# Patient Record
Sex: FEMALE | Race: OTHER | Employment: FULL TIME | URBAN - METROPOLITAN AREA
[De-identification: names, ages, dates, MRNs, and addresses within clinical notes are randomized per-mention and may not be internally consistent; named-entity substitution may affect disease eponyms.]

---

## 2020-04-01 ENCOUNTER — OFFICE VISIT (OUTPATIENT)
Dept: URGENT CARE | Facility: CLINIC | Age: 30
End: 2020-04-01
Payer: COMMERCIAL

## 2020-04-01 VITALS
HEART RATE: 86 BPM | DIASTOLIC BLOOD PRESSURE: 86 MMHG | SYSTOLIC BLOOD PRESSURE: 126 MMHG | HEIGHT: 62 IN | BODY MASS INDEX: 27.6 KG/M2 | OXYGEN SATURATION: 98 % | TEMPERATURE: 99 F | WEIGHT: 150 LBS | RESPIRATION RATE: 18 BRPM

## 2020-04-01 DIAGNOSIS — R51.9 NONINTRACTABLE HEADACHE, UNSPECIFIED CHRONICITY PATTERN, UNSPECIFIED HEADACHE TYPE: ICD-10-CM

## 2020-04-01 DIAGNOSIS — R11.0 NAUSEA: Primary | ICD-10-CM

## 2020-04-01 PROCEDURE — 99203 OFFICE O/P NEW LOW 30 MIN: CPT | Mod: S$GLB,,, | Performed by: NURSE PRACTITIONER

## 2020-04-01 PROCEDURE — 99203 PR OFFICE/OUTPT VISIT, NEW, LEVL III, 30-44 MIN: ICD-10-PCS | Mod: S$GLB,,, | Performed by: NURSE PRACTITIONER

## 2020-04-01 RX ORDER — ONDANSETRON 4 MG/1
4 TABLET, ORALLY DISINTEGRATING ORAL EVERY 8 HOURS PRN
Qty: 15 TABLET | Refills: 0 | Status: SHIPPED | OUTPATIENT
Start: 2020-04-01

## 2020-04-01 NOTE — PATIENT INSTRUCTIONS
Nausea & Vomiting  If your condition worsens or fails to improve we recommend that you receive another evaluation at the ER immediately or contact your PCP to discuss your concerns or return here. You must understand that you've received an urgent care treatment only and that you may be released before all your medical problems are known or treated. You the patient will arrange for followup care as instructed.   Use prescribed anti-nausea medicine as needed and prescribed   Increase fluids and rest is important   Start off with liquid diet and progress as tolerated (see below)  · Water and clear liquids are important so you do not get dehydrated. Drink a small amount at a time.  · Do not force yourself to eat, especially if you have cramps, vomiting, or diarrhea. When you finally decide to start eating, do not eat large amounts at a time, even if you are hungry.  · If you eat, avoid fatty, greasy, spicy, or fried foods.  · Do not eat dairy products if you have diarrhea; they can make the diarrhea worse  Watch for any increase pain, fever, localized pain to right lower abdomen or continued vomiting or diarrhea.    Use gatorade/pedialyte/coconut water or rehydration packets to help stay hydrated. Vitamin water and plain water do not contain rehydrating electrolytes.  Increase clear liquids (water, gatorade, pedialyte, broths, jello, etc) Hold off on solids for 12-18 hours. Then advance to BRAT diet (banana, rice, applesauce, tea, toast/crackers), then advance further as tolerated.  Use Peptobismol to help alleviate your diarrhea symptoms. Avoid immodium.

## 2020-04-01 NOTE — PROGRESS NOTES
"Subjective:       Patient ID: Brandi Connors is a 30 y.o. female.    Vitals:  height is 5' 2.4" (1.585 m) and weight is 68 kg (150 lb). Her temperature is 98.7 °F (37.1 °C). Her blood pressure is 126/86 and her pulse is 86. Her respiration is 18 and oxygen saturation is 98%.     Chief Complaint: Nausea    Nausea   This is a new problem. The current episode started in the past 7 days (3 to 4 days ago). The problem occurs rarely. The problem has been unchanged. Associated symptoms include abdominal pain and nausea. Pertinent negatives include no anorexia, arthralgias, change in bowel habit, chest pain, chills, congestion, coughing, diaphoresis, fatigue, fever, headaches, joint swelling, myalgias, neck pain, numbness, rash, sore throat, swollen glands, urinary symptoms, vertigo, visual change, vomiting or weakness. Exacerbated by: sitting. Treatments tried: nextium. The treatment provided no relief.       Constitution: Negative for appetite change, chills, sweating, fatigue and fever.   HENT: Negative for congestion, sore throat and trouble swallowing.    Neck: Negative for neck pain.   Cardiovascular: Negative for chest pain.   Respiratory: Negative for cough and shortness of breath.    Gastrointestinal: Positive for abdominal pain and nausea. Negative for abdominal trauma, abdominal bloating, history of abdominal surgery, vomiting, constipation, diarrhea, dark colored stools and heartburn.   Genitourinary: Negative for dysuria, missed menses and pelvic pain.   Musculoskeletal: Negative for joint pain, joint swelling, back pain and muscle ache.   Skin: Negative for rash.   Neurological: Negative for history of vertigo, headaches and numbness.       Objective:      Physical Exam   Constitutional: She is oriented to person, place, and time. She appears well-developed and well-nourished.   HENT:   Head: Normocephalic and atraumatic.   Right Ear: External ear normal.   Left Ear: External ear normal.   Nose: Nose normal. "   Mouth/Throat: Mucous membranes are normal.   Eyes: Conjunctivae and lids are normal.   Neck: Trachea normal and full passive range of motion without pain. Neck supple.   Cardiovascular: Normal rate, regular rhythm and normal heart sounds.   Pulmonary/Chest: Effort normal and breath sounds normal. No respiratory distress.   Abdominal: Soft. Normal appearance and bowel sounds are normal. She exhibits no distension, no abdominal bruit, no pulsatile midline mass and no mass. There is no tenderness.   Nausea after eating pizza four days ago.  Non - tender abdomen   Musculoskeletal: Normal range of motion. She exhibits no edema.   Neurological: She is alert and oriented to person, place, and time. She has normal strength.   Mild headache   Skin: Skin is warm, dry, intact, not diaphoretic and not pale.   Psychiatric: She has a normal mood and affect. Her speech is normal and behavior is normal. Judgment and thought content normal. Cognition and memory are normal.   Nursing note and vitals reviewed.        Assessment:       1. Nausea    2. Nonintractable headache, unspecified chronicity pattern, unspecified headache type        Plan:         Nausea  -     ondansetron (ZOFRAN-ODT) 4 MG TbDL; Take 1 tablet (4 mg total) by mouth every 8 (eight) hours as needed.  Dispense: 15 tablet; Refill: 0    Nonintractable headache, unspecified chronicity pattern, unspecified headache type          Patient Instructions   Nausea & Vomiting  If your condition worsens or fails to improve we recommend that you receive another evaluation at the ER immediately or contact your PCP to discuss your concerns or return here. You must understand that you've received an urgent care treatment only and that you may be released before all your medical problems are known or treated. You the patient will arrange for followup care as instructed.   Use prescribed anti-nausea medicine as needed and prescribed   Increase fluids and rest is important   Start  off with liquid diet and progress as tolerated (see below)  · Water and clear liquids are important so you do not get dehydrated. Drink a small amount at a time.  · Do not force yourself to eat, especially if you have cramps, vomiting, or diarrhea. When you finally decide to start eating, do not eat large amounts at a time, even if you are hungry.  · If you eat, avoid fatty, greasy, spicy, or fried foods.  · Do not eat dairy products if you have diarrhea; they can make the diarrhea worse  Watch for any increase pain, fever, localized pain to right lower abdomen or continued vomiting or diarrhea.    Use gatorade/pedialyte/coconut water or rehydration packets to help stay hydrated. Vitamin water and plain water do not contain rehydrating electrolytes.  Increase clear liquids (water, gatorade, pedialyte, broths, jello, etc) Hold off on solids for 12-18 hours. Then advance to BRAT diet (banana, rice, applesauce, tea, toast/crackers), then advance further as tolerated.  Use Peptobismol to help alleviate your diarrhea symptoms. Avoid immodium.

## 2020-06-25 ENCOUNTER — OFFICE VISIT (OUTPATIENT)
Dept: URGENT CARE | Facility: CLINIC | Age: 30
End: 2020-06-25
Payer: COMMERCIAL

## 2020-06-25 VITALS
BODY MASS INDEX: 27.6 KG/M2 | OXYGEN SATURATION: 98 % | WEIGHT: 150 LBS | HEART RATE: 101 BPM | TEMPERATURE: 99 F | HEIGHT: 62 IN

## 2020-06-25 DIAGNOSIS — J00 NASOPHARYNGITIS: ICD-10-CM

## 2020-06-25 DIAGNOSIS — Z03.818 ENCOUNTER FOR OBSERVATION FOR SUSPECTED EXPOSURE TO OTHER BIOLOGICAL AGENTS RULED OUT: ICD-10-CM

## 2020-06-25 DIAGNOSIS — H66.002 NON-RECURRENT ACUTE SUPPURATIVE OTITIS MEDIA OF LEFT EAR WITHOUT SPONTANEOUS RUPTURE OF TYMPANIC MEMBRANE: Primary | ICD-10-CM

## 2020-06-25 PROCEDURE — 99214 OFFICE O/P EST MOD 30 MIN: CPT | Mod: S$GLB,,, | Performed by: STUDENT IN AN ORGANIZED HEALTH CARE EDUCATION/TRAINING PROGRAM

## 2020-06-25 PROCEDURE — 99214 PR OFFICE/OUTPT VISIT, EST, LEVL IV, 30-39 MIN: ICD-10-PCS | Mod: S$GLB,,, | Performed by: STUDENT IN AN ORGANIZED HEALTH CARE EDUCATION/TRAINING PROGRAM

## 2020-06-25 PROCEDURE — U0003 INFECTIOUS AGENT DETECTION BY NUCLEIC ACID (DNA OR RNA); SEVERE ACUTE RESPIRATORY SYNDROME CORONAVIRUS 2 (SARS-COV-2) (CORONAVIRUS DISEASE [COVID-19]), AMPLIFIED PROBE TECHNIQUE, MAKING USE OF HIGH THROUGHPUT TECHNOLOGIES AS DESCRIBED BY CMS-2020-01-R: HCPCS

## 2020-06-25 RX ORDER — IPRATROPIUM BROMIDE 42 UG/1
2 SPRAY, METERED NASAL 3 TIMES DAILY
Qty: 15 ML | Refills: 0 | Status: SHIPPED | OUTPATIENT
Start: 2020-06-25 | End: 2020-06-30

## 2020-06-25 RX ORDER — AMOXICILLIN AND CLAVULANATE POTASSIUM 875; 125 MG/1; MG/1
1 TABLET, FILM COATED ORAL EVERY 12 HOURS
Qty: 10 TABLET | Refills: 0 | Status: SHIPPED | OUTPATIENT
Start: 2020-06-25 | End: 2020-06-30

## 2020-06-25 NOTE — PATIENT INSTRUCTIONS

## 2020-06-25 NOTE — PROGRESS NOTES
"Subjective:       Patient ID: Brandi Connors is a 30 y.o. female.    Vitals:  height is 5' 2" (1.575 m) and weight is 68 kg (150 lb). Her tympanic temperature is 98.5 °F (36.9 °C). Her pulse is 101. Her oxygen saturation is 98%.     Chief Complaint: Sinus Problem    Pt presents for URI sx's x1d. Reports sinus congestion/pain, bilateral ear pain, sore throat, and chills. Denied lower respiratory symptoms and fevers, no known sick contacts.    Sinus Problem  This is a new problem. The current episode started yesterday. The problem is unchanged. There has been no fever. Her pain is at a severity of 2/10. The pain is mild. Associated symptoms include chills, congestion, ear pain, headaches, sinus pressure and a sore throat. Pertinent negatives include no coughing, diaphoresis, neck pain, shortness of breath or swollen glands. Treatments tried: Allegra  The treatment provided mild relief.       Constitution: Positive for chills. Negative for sweating, fatigue and fever.   HENT: Positive for ear pain, congestion, sinus pain, sinus pressure and sore throat. Negative for ear discharge, tinnitus, hearing loss, trouble swallowing and voice change.    Neck: Negative for neck pain and painful lymph nodes.   Cardiovascular: Negative for chest pain, palpitations and sob on exertion.   Eyes: Negative for eye discharge, eye itching and eye redness.   Respiratory: Negative for chest tightness, cough, sputum production, shortness of breath, stridor, wheezing and asthma.    Gastrointestinal: Negative for abdominal pain, nausea, vomiting and diarrhea.   Musculoskeletal: Negative for joint pain, joint swelling and muscle ache.   Skin: Negative for color change, rash and lesion.   Allergic/Immunologic: Negative for seasonal allergies and asthma.   Neurological: Positive for headaches. Negative for dizziness and light-headedness.   Hematologic/Lymphatic: Negative for swollen lymph nodes.   Psychiatric/Behavioral: Negative for confusion, " agitation and sleep disturbance.       Objective:      Physical Exam   Constitutional: She is oriented to person, place, and time. She appears well-developed. No distress.   HENT:   Head: Normocephalic and atraumatic.   Right Ear: Hearing, tympanic membrane, external ear and ear canal normal.   Left Ear: Hearing, external ear and ear canal normal. No tenderness. No mastoid tenderness. Tympanic membrane is erythematous. Tympanic membrane is not perforated. A middle ear effusion is present.   Eyes: Conjunctivae and EOM are normal. Right eye exhibits no discharge. Left eye exhibits no discharge. No scleral icterus.   Neck: Normal range of motion. Neck supple.   Cardiovascular: Normal rate, regular rhythm and normal heart sounds. Exam reveals no friction rub.   No murmur heard.  Pulmonary/Chest: Effort normal and breath sounds normal. No respiratory distress. She has no wheezes. She has no rales.   Musculoskeletal: Normal range of motion.   Neurological: She is alert and oriented to person, place, and time. No cranial nerve deficit (CN II-XII grossly intact).   Skin: Skin is warm, dry and no rash.   Psychiatric: Her behavior is normal. Judgment and thought content normal.   Nursing note and vitals reviewed.        Assessment:       1. Non-recurrent acute suppurative otitis media of left ear without spontaneous rupture of tympanic membrane    2. Nasopharyngitis    3. Encounter for observation for suspected exposure to other biological agents ruled out        Plan:         Non-recurrent acute suppurative otitis media of left ear without spontaneous rupture of tympanic membrane  -     amoxicillin-clavulanate 875-125mg (AUGMENTIN) 875-125 mg per tablet; Take 1 tablet by mouth every 12 (twelve) hours. for 5 days  Dispense: 10 tablet; Refill: 0    Nasopharyngitis  -     COVID-19 Routine Screening  -     ipratropium (ATROVENT) 42 mcg (0.06 %) nasal spray; 2 sprays by Nasal route 3 (three) times daily. for 5 days  Dispense: 15  mL; Refill: 0    Encounter for observation for suspected exposure to other biological agents ruled out  -     COVID-19 Routine Screening  - counseled patient on home isolation protocols pending test results, questions answered and return precautions given    Medications and diagnosis reviewed with patient, questions answered, and return precautions given.    Follow up if symptoms worsen or fail to improve.    Carlos Howard MD/MPH  [facility]  [facility]

## 2020-06-26 ENCOUNTER — NURSE TRIAGE (OUTPATIENT)
Dept: ADMINISTRATIVE | Facility: CLINIC | Age: 30
End: 2020-06-26

## 2020-06-26 NOTE — TELEPHONE ENCOUNTER
Was tested for COVID and awaiting results. instructed pt lab has not resulted. Pt does have MyCHart. instructed pt lab result should show in MyChart and the  office may call with result when does occur. instructed pt takes at least 24-72 hours to result. Verbalizes understanding.     Reason for Disposition   General information question, no triage required and triager able to answer question    Additional Information   Negative: RN needs further essential information from caller in order to complete triage   Negative: Requesting regular office appointment   Negative: [1] Caller requesting NON-URGENT health information AND [2] PCP's office is the best resource   Negative: Health Information question, no triage required and triager able to answer question    Protocols used: INFORMATION ONLY CALL-A-AH

## 2020-06-29 LAB — SARS-COV-2 RNA RESP QL NAA+PROBE: NOT DETECTED

## 2021-02-17 ENCOUNTER — OFFICE VISIT (OUTPATIENT)
Dept: URGENT CARE | Facility: CLINIC | Age: 31
End: 2021-02-17
Payer: COMMERCIAL

## 2021-02-17 VITALS
OXYGEN SATURATION: 98 % | HEIGHT: 62 IN | WEIGHT: 160 LBS | TEMPERATURE: 99 F | SYSTOLIC BLOOD PRESSURE: 121 MMHG | BODY MASS INDEX: 29.44 KG/M2 | HEART RATE: 103 BPM | RESPIRATION RATE: 14 BRPM | DIASTOLIC BLOOD PRESSURE: 85 MMHG

## 2021-02-17 DIAGNOSIS — K52.9 GASTROENTERITIS, ACUTE: Primary | ICD-10-CM

## 2021-02-17 PROCEDURE — 99214 PR OFFICE/OUTPT VISIT, EST, LEVL IV, 30-39 MIN: ICD-10-PCS | Mod: S$GLB,,, | Performed by: INTERNAL MEDICINE

## 2021-02-17 PROCEDURE — 3008F BODY MASS INDEX DOCD: CPT | Mod: CPTII,S$GLB,, | Performed by: INTERNAL MEDICINE

## 2021-02-17 PROCEDURE — 3008F PR BODY MASS INDEX (BMI) DOCUMENTED: ICD-10-PCS | Mod: CPTII,S$GLB,, | Performed by: INTERNAL MEDICINE

## 2021-02-17 PROCEDURE — 99214 OFFICE O/P EST MOD 30 MIN: CPT | Mod: S$GLB,,, | Performed by: INTERNAL MEDICINE

## 2021-02-17 RX ORDER — DIPHENOXYLATE HYDROCHLORIDE AND ATROPINE SULFATE 2.5; .025 MG/1; MG/1
TABLET ORAL
Qty: 20 TABLET | Refills: 0 | Status: SHIPPED | OUTPATIENT
Start: 2021-02-17

## 2021-02-17 RX ORDER — CIPROFLOXACIN 500 MG/1
500 TABLET ORAL 2 TIMES DAILY
Qty: 14 TABLET | Refills: 0 | Status: SHIPPED | OUTPATIENT
Start: 2021-02-17 | End: 2021-02-24